# Patient Record
Sex: MALE | Race: BLACK OR AFRICAN AMERICAN
[De-identification: names, ages, dates, MRNs, and addresses within clinical notes are randomized per-mention and may not be internally consistent; named-entity substitution may affect disease eponyms.]

---

## 2018-07-15 ENCOUNTER — HOSPITAL ENCOUNTER (EMERGENCY)
Dept: HOSPITAL 62 - ER | Age: 23
LOS: 1 days | Discharge: HOME | End: 2018-07-16
Payer: SELF-PAY

## 2018-07-15 DIAGNOSIS — R10.9: Primary | ICD-10-CM

## 2018-07-15 DIAGNOSIS — R55: ICD-10-CM

## 2018-07-15 DIAGNOSIS — S09.90XA: ICD-10-CM

## 2018-07-15 DIAGNOSIS — E86.0: ICD-10-CM
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
## 2018-07-15 DIAGNOSIS — F17.200: ICD-10-CM

## 2018-07-15 LAB
ADD MANUAL DIFF: NO
ALBUMIN SERPL-MCNC: 4.7 G/DL (ref 3.5–5)
ALP SERPL-CCNC: 41 U/L (ref 38–126)
ALT SERPL-CCNC: 18 U/L (ref 21–72)
ANION GAP SERPL CALC-SCNC: 11 MMOL/L (ref 5–19)
APPEARANCE UR: (no result)
APTT PPP: YELLOW S
AST SERPL-CCNC: 26 U/L (ref 17–59)
BASOPHILS # BLD AUTO: 0 10^3/UL (ref 0–0.2)
BASOPHILS NFR BLD AUTO: 0.2 % (ref 0–2)
BILIRUB DIRECT SERPL-MCNC: 0.3 MG/DL (ref 0–0.4)
BILIRUB SERPL-MCNC: 0.7 MG/DL (ref 0.2–1.3)
BILIRUB UR QL STRIP: NEGATIVE
BUN SERPL-MCNC: 10 MG/DL (ref 7–20)
CALCIUM: 10 MG/DL (ref 8.4–10.2)
CHLORIDE SERPL-SCNC: 103 MMOL/L (ref 98–107)
CO2 SERPL-SCNC: 30 MMOL/L (ref 22–30)
EOSINOPHIL # BLD AUTO: 0 10^3/UL (ref 0–0.6)
EOSINOPHIL NFR BLD AUTO: 0.3 % (ref 0–6)
ERYTHROCYTE [DISTWIDTH] IN BLOOD BY AUTOMATED COUNT: 13.4 % (ref 11.5–14)
GLUCOSE SERPL-MCNC: 94 MG/DL (ref 75–110)
GLUCOSE UR STRIP-MCNC: NEGATIVE MG/DL
HCT VFR BLD CALC: 43.5 % (ref 37.9–51)
HGB BLD-MCNC: 14.8 G/DL (ref 13.5–17)
KETONES UR STRIP-MCNC: (no result) MG/DL
LYMPHOCYTES # BLD AUTO: 1 10^3/UL (ref 0.5–4.7)
LYMPHOCYTES NFR BLD AUTO: 12.9 % (ref 13–45)
MCH RBC QN AUTO: 28.9 PG (ref 27–33.4)
MCHC RBC AUTO-ENTMCNC: 34 G/DL (ref 32–36)
MCV RBC AUTO: 85 FL (ref 80–97)
MONOCYTES # BLD AUTO: 0.8 10^3/UL (ref 0.1–1.4)
MONOCYTES NFR BLD AUTO: 9.9 % (ref 3–13)
NEUTROPHILS # BLD AUTO: 5.9 10^3/UL (ref 1.7–8.2)
NEUTS SEG NFR BLD AUTO: 76.7 % (ref 42–78)
NITRITE UR QL STRIP: NEGATIVE
PH UR STRIP: 5 [PH] (ref 5–9)
PLATELET # BLD: 320 10^3/UL (ref 150–450)
POTASSIUM SERPL-SCNC: 4.1 MMOL/L (ref 3.6–5)
PROT SERPL-MCNC: 7.8 G/DL (ref 6.3–8.2)
PROT UR STRIP-MCNC: 100 MG/DL
RBC # BLD AUTO: 5.12 10^6/UL (ref 4.35–5.55)
SODIUM SERPL-SCNC: 144.2 MMOL/L (ref 137–145)
SP GR UR STRIP: 1.02
TOTAL CELLS COUNTED % (AUTO): 100 %
UROBILINOGEN UR-MCNC: 2 MG/DL (ref ?–2)
WBC # BLD AUTO: 7.7 10^3/UL (ref 4–10.5)

## 2018-07-15 PROCEDURE — 93010 ELECTROCARDIOGRAM REPORT: CPT

## 2018-07-15 PROCEDURE — 99284 EMERGENCY DEPT VISIT MOD MDM: CPT

## 2018-07-15 PROCEDURE — 85025 COMPLETE CBC W/AUTO DIFF WBC: CPT

## 2018-07-15 PROCEDURE — 80053 COMPREHEN METABOLIC PANEL: CPT

## 2018-07-15 PROCEDURE — 96375 TX/PRO/DX INJ NEW DRUG ADDON: CPT

## 2018-07-15 PROCEDURE — 96361 HYDRATE IV INFUSION ADD-ON: CPT

## 2018-07-15 PROCEDURE — 93005 ELECTROCARDIOGRAM TRACING: CPT

## 2018-07-15 PROCEDURE — 96374 THER/PROPH/DIAG INJ IV PUSH: CPT

## 2018-07-15 PROCEDURE — 81001 URINALYSIS AUTO W/SCOPE: CPT

## 2018-07-15 PROCEDURE — 36415 COLL VENOUS BLD VENIPUNCTURE: CPT

## 2018-07-15 NOTE — ER DOCUMENT REPORT
ED General





- General


Chief Complaint: Passed Out Prior to Arrival


Stated Complaint: PASSED OUT


Time Seen by Provider: 07/15/18 22:09


Notes: 





Patient is a 22-year-old male who presents after an episode of syncope.  The 

patient reports that he was sitting in his front porch when he became 

lightheaded and passed out.  He reports that he drank very little water and ate 

nothing all day.  He states that he simply did not have an appetite.  He states 

that since passing out he has developed a dull, aching, mild pain to the right 

side of his abdomen and flank.  Nothing improves or worsens this pain.  He 

states that he did hit his head when he passed out but denies anything beyond a 

mild, throbbing pain to the right temporal aspect of his scalp.  He denies any 

focal weakness, numbness, confusion, difficulty ambulating or vomiting since 

that time.  He has not seen his general doctor regarding today's concerns.  He 

denies any history of similar symptoms in the past.


TRAVEL OUTSIDE OF THE [place] IN LAST 30 DAYS: No





- Related Data


Allergies/Adverse Reactions: 


 





No Known Allergies Allergy (Verified 01/11/16 17:48)


 











Past Medical History





- General


Information source: Patient





- Social History


Smoking Status: Current Every Day Smoker


Chew tobacco use (# tins/day): No


Frequency of alcohol use: Occasional


Drug Abuse: Marijuana


Lives with: Family


Family History: Reviewed & Not Pertinent


Patient has suicidal ideation: No


Patient has homicidal ideation: No


Renal/ Medical History: Denies: Hx Peritoneal Dialysis





- Immunizations


Hx Diphtheria, Pertussis, Tetanus Vaccination: No





Review of Systems





- Review of Systems


Notes: 





Constitutional: Negative for fever.


HENT: Negative for sore throat.


Eyes: Negative for visual changes.


Cardiovascular: Negative for chest pain.


Respiratory: Negative for shortness of breath.


Gastrointestinal: Positive for abdominal pain


Genitourinary: Negative for dysuria.


Musculoskeletal: Negative for back pain.


Skin: Negative for rash.


Neurological: Negative for headaches, weakness or numbness.





10 point ROS negative except as marked above and in HPI.





Physical Exam





- Vital signs


Vitals: 


 











Temp Pulse BP Pulse Ox


 


 98.4 F   63   122/71   100 


 


 07/15/18 21:33  07/15/18 21:33  07/15/18 21:33  07/15/18 21:33











Interpretation: Normal


Notes: 





PHYSICAL EXAMINATION:





GENERAL: Well-appearing, well-nourished and in no acute distress.





HEAD: Atraumatic, normocephalic.





EYES: Pupils equal round and reactive to light, extraocular movements intact, 

sclera anicteric, conjunctiva are normal.





ENT: nares patent, oropharynx clear without exudates.  Moist mucous membranes.





NECK: Normal range of motion, supple without lymphadenopathy





LUNGS: Breath sounds clear to auscultation bilaterally and equal.  No wheezes 

rales or rhonchi.





HEART: Regular rate and rhythm without murmurs





ABDOMEN: Soft, nontender, normoactive bowel sounds.  No guarding, no rebound.  

No masses appreciated.





EXTREMITIES: Normal range of motion, no pitting or edema.  No cyanosis.





NEUROLOGICAL: No focal neurological deficits. Moves all extremities 

spontaneously and on command.





PSYCH: Normal mood, normal affect.





SKIN: Warm, Dry, normal turgor, no rashes or lesions noted.





Course





- Re-evaluation


Re-evalutation: 





07/15/18 22:59


Presentation of syncope of unclear etiology. Patient normotensive, alert, 

without focal neurologic deficits at time of arrival. Denies syncope was during 

exertion. No preceding symptoms of palpitations, chest pain, or shortness of 

breath. Patient asymptomatic at time of arrival. EKG is without evidence of HCOM

, right heart strain, ST changes to suggest ischemia, prolong QTc, delta wave, 

epsilon wave, or Brugada syndrome. Patient denies any family history of sudden 

cardiac death, personal history of of structural heart disease. Patient denies 

any symptoms to suggest an acute PE, MI, TAD, SAH, seizure, or acute GI bleed 

as the etiology of their syncope today. On exam, no murmurs to suggest critical 

aortic stenosis as possible etiology.  Patient did complain of some mild right 

flank pain which is minimally present on exam.  Abdominal exam is otherwise 

benign.  I do not clinically suspect an acute appendicitis, biliary pathology, 

acute nephrolithiasis or alternative acute intra-abdominal pathology at this 

time.





When patient syncopized he reports he did strike the right side of his head.   

No focal neurologic deficits on exam, no evidence of basilar skull fracture on 

exam without evidence of hemotympanum, raccoon eyes, or periauricular hematoma.

  No papilledema.  Patient is not on anticoagulation.  GCS is 15.  No episodes 

of vomiting.  Patient is therefore negative via [place] head CT criteria and 

CT imaging will not be obtained at this time.





 Based on overall clinical history, exam findings, vitals, and patients 

appearance, I feel it is safe for patient to be discharged home at this time 

with close outpatient follow-up and strict return precautions. Patient is in 

agreement with this plan, has verbalized indications for return to ED, and 

questions have been answered.





- Vital Signs


Vital signs: 


 











Temp Pulse Resp BP Pulse Ox


 


 97.3 F   62      126/61 H  97 


 


 07/16/18 00:25  07/16/18 00:25     07/16/18 00:25  07/16/18 00:25














- Laboratory


Result Diagrams: 


 07/15/18 22:00





 07/15/18 22:00


Laboratory results interpreted by me: 


 











  07/15/18 07/15/18 07/15/18





  22:00 22:00 22:05


 


Lymphocytes %  12.9 L  


 


ALT   18 L 


 


Urine Protein    100 H


 


Urine Ketones    TRACE H


 


Urine Blood    SMALL H


 


Urine Urobilinogen    2.0 H














- EKG Interpretation by Me


Additional EKG results interpreted by me: 





07/16/18 04:11


Sinus bradycardia.  Rate 54.  No ST elevations or depressions.  Early 

repolarization pattern.  .





Discharge





- Discharge


Clinical Impression: 


 Right flank pain, Dehydration





Syncope


Qualifiers:


 Syncope type: unspecified Qualified Code(s): R55 - Syncope and collapse





Head trauma


Qualifiers:


 Encounter type: initial encounter Qualified Code(s): S09.90XA - Unspecified 

injury of head, initial encounter





Condition: Stable


Disposition: HOME, SELF-CARE


Additional Instructions: 


You were seen today after an episode of passing out.  Your EKG here is normal.  

At this time, we do not feel that your episode of passing out was from any life-

threatening cause. Please  drink plenty of fluids over the next several days.  

Return to emergency department if you have any further episodes of syncope, 

headache, weakness, numbness, chest pain, or shortness of breath.  Please 

follow up closely with your primary care physician.





You have likely sustained a contusion (bruise) to your head.   Symptoms to 

expect from a concussion include nausea, mild to moderate headache, difficulty 

concentrating or sleeping, and mild lightheadedness.  These symptoms should 

improve over the next few days to weeks.  Return to the emergency department or 

follow-up with your primary care doctor if your symptoms are not improving over 

this time.  Signs of a more serious head injury include vomiting, severe 

headache, excessive sleepiness or confusion, and weakness or numbness in your 

face, arms or legs.  Return immediately to the Emergency Department if you 

experience any of these more concerning symptoms.  Rest, avoid strenuous 

physical or mental activity, and avoid activities that could potentially result 

in another head injury until all your symptoms from this head injury are 

completely resolved for at least 2-3 weeks.  If you participate in sports, get 

cleared by your doctor or  before returning to play.  You may take 

ibuprofen or acetaminophen over the counter according to label instructions for 

mild headache or scalp soreness.

## 2018-07-16 VITALS — DIASTOLIC BLOOD PRESSURE: 61 MMHG | SYSTOLIC BLOOD PRESSURE: 126 MMHG

## 2018-07-16 NOTE — EKG REPORT
SEVERITY:- ABNORMAL ECG -

SINUS RHYTHM

ABNORMAL Q SUGGESTS ANTERIOR INFARCT

ST ELEV, PROBABLE NORMAL EARLY REPOL PATTERN

:

Confirmed by: Echo Rosales MD 16-Jul-2018 00:50:09